# Patient Record
Sex: FEMALE | Race: WHITE | NOT HISPANIC OR LATINO | Employment: FULL TIME | URBAN - METROPOLITAN AREA
[De-identification: names, ages, dates, MRNs, and addresses within clinical notes are randomized per-mention and may not be internally consistent; named-entity substitution may affect disease eponyms.]

---

## 2020-05-21 ENCOUNTER — HOSPITAL ENCOUNTER (EMERGENCY)
Facility: HOSPITAL | Age: 35
Discharge: HOME | End: 2020-05-21

## 2020-05-21 VITALS
HEIGHT: 70 IN | HEART RATE: 113 BPM | DIASTOLIC BLOOD PRESSURE: 72 MMHG | SYSTOLIC BLOOD PRESSURE: 127 MMHG | OXYGEN SATURATION: 97 % | TEMPERATURE: 98 F | WEIGHT: 179 LBS | BODY MASS INDEX: 25.62 KG/M2 | RESPIRATION RATE: 18 BRPM

## 2020-05-21 DIAGNOSIS — S86.002A INJURY OF LEFT ACHILLES TENDON, INITIAL ENCOUNTER: ICD-10-CM

## 2020-05-21 DIAGNOSIS — M79.89 LEFT LEG SWELLING: Primary | ICD-10-CM

## 2020-05-21 PROCEDURE — 99284 EMERGENCY DEPT VISIT MOD MDM: CPT | Mod: 25 | Performed by: NURSE PRACTITIONER

## 2020-05-21 PROCEDURE — 93971 EXTREMITY STUDY: CPT | Mod: TC,LT

## 2020-05-21 PROCEDURE — 93971 US LOWER EXTREMITY VEINS LEFT: ICD-10-PCS | Mod: 26,LT,, | Performed by: RADIOLOGY

## 2020-05-21 PROCEDURE — 93971 EXTREMITY STUDY: CPT | Mod: 26,LT,, | Performed by: RADIOLOGY

## 2020-05-21 PROCEDURE — 29515 APPLICATION SHORT LEG SPLINT: CPT | Mod: LT | Performed by: NURSE PRACTITIONER

## 2020-05-21 NOTE — ED PROVIDER NOTES
"Encounter Date: 5/21/2020       History     Chief Complaint   Patient presents with    left calf pain/swelling/bruising     Benjamín Borden is a 35 year old female with no sign past medical history. She presents to ED with pain, swelling and discoloration to lower extremity    Patient reports that she forcefully stood up from a sitting position and felt pain to calf that she describes as a "joss horse". She awoke that next day with pain, swelling and discoloration from mid-calf to ankle.. She denies any other injury or trauma.    She was seen at the  today. XR tib-fib negative. She was referred to ED for US to rule out DVT    Patient with sign swelling to calf and area overlying achilles tendon. She is unable to planterflex with calf squeeze. She has light purple discoloration from mid-calf to posterior aspect of ankle    No pain or tenderness to medial or lateral aspect of ankle    Lower extremity is neurologically intact    No fever, chills, chest pain, dyspnea, weakness, syncope, dizziness or vomiting    No hx of DVT. She is not a smoker        Review of patient's allergies indicates:  No Known Allergies  History reviewed. No pertinent past medical history.  Past Surgical History:   Procedure Laterality Date    TONSILLECTOMY  1996     Family History   Problem Relation Age of Onset    Hypothyroidism Mother     Diabetes Sister      Social History     Tobacco Use    Smoking status: Current Every Day Smoker     Packs/day: 1.00     Types: Cigarettes   Substance Use Topics    Alcohol use: Not on file    Drug use: Not on file     Review of Systems   Constitutional: Negative.    HENT: Negative.    Eyes: Negative.    Respiratory: Negative.    Cardiovascular: Negative.    Gastrointestinal: Negative.    Endocrine: Negative.    Genitourinary: Negative.    Musculoskeletal: Positive for arthralgias (right calf/achilles).   Skin: Negative.    Allergic/Immunologic: Negative.    Neurological: Negative.  "   Hematological: Negative.    Psychiatric/Behavioral: Negative.        Physical Exam     Initial Vitals [05/21/20 1457]   BP Pulse Resp Temp SpO2   127/72 (!) 113 18 98.4 °F (36.9 °C) 97 %      MAP       --         Physical Exam    Vitals reviewed.  Constitutional: She appears well-developed and well-nourished. She is not diaphoretic. No distress.   HENT:   Head: Normocephalic.   Right Ear: External ear normal.   Left Ear: External ear normal.   Nose: Nose normal.   Mouth/Throat: Oropharynx is clear and moist.   Neck: Normal range of motion. Neck supple.   Cardiovascular: Normal rate.   Pulmonary/Chest: Breath sounds normal.   Musculoskeletal: She exhibits tenderness.        Left lower leg: She exhibits tenderness and swelling. She exhibits no edema, no deformity and no laceration.        Legs:       Left foot: There is decreased range of motion and swelling. There is no tenderness, no bony tenderness, normal capillary refill, no crepitus, no deformity and no laceration.   Neurological: She is alert and oriented to person, place, and time. GCS score is 15. GCS eye subscore is 4. GCS verbal subscore is 5. GCS motor subscore is 6.   Skin: Skin is warm. Capillary refill takes less than 2 seconds.   Psychiatric: She has a normal mood and affect. Her behavior is normal. Judgment and thought content normal.         ED Course   Splint Application  Date/Time: 5/21/2020 8:03 PM  Performed by: Hailey Lala NP  Authorized by: Hailey Lala NP   Splint type: short leg  Supplies used: elastic bandage,  Ortho-Glass and cotton padding  Post-procedure: The splinted body part was neurovascularly unchanged following the procedure.  Patient tolerance: Patient tolerated the procedure well with no immediate complications        Labs Reviewed - No data to display       Imaging Results    None          Medical Decision Making:   Initial Assessment:   Patient with pain, swelling and discoloration to lower extremity    Patient reports  "that she forcefully stood up from a sitting position and felt pain to calf that she describes as a "joss horse". She awoke that next day with pain, swelling and discoloration from mid-calf to ankle.. She denies any other injury or trauma.    She was seen at the  today. XR tib-fib negative. She was referred to ED for US to rule out DVT    Patient with sign swelling to calf and area overlying achilles tendon. She is unable to planterflex with calf squeeze. She has light purple discoloration from mid-calf to posterior aspect of ankle    No pain or tenderness to medial or lateral aspect of ankle    Lower extremity is neurologically intact    No fever, chills, chest pain, dyspnea, weakness, syncope, dizziness or vomiting    No hx of DVT. She is not a smoker    Differential Diagnosis:   Bone injury, DVT, achilles tendon injury, muscle strain   ED Management:  US negative    Post splint and crutches until follow-up with Ortho    Patient wants to take Motrin for pain    Discussed physical exam findings with patient  No acute emergent medical condition identified at this time to warrant further testing/diagnostics  At this time, I believe the patient is clinically stable for discharge.   Patient to follow up with Ortho in 1-2 days.  The patient acknowledges that close follow up with a MD is required after all ER visits  Pt given instructions; take all medications prescribed in the ER as directed.   Patient agrees to comply with all instruction and direction given in the ER  Pt agrees to return to ER if any symptoms reoccur                                          Clinical Impression:       ICD-10-CM ICD-9-CM   1. Left leg swelling M79.89 729.81   2. Injury of left Achilles tendon, initial encounter S86.002A 959.7                                Hailey Lala NP  05/21/20 2004    "

## 2020-05-21 NOTE — DISCHARGE INSTRUCTIONS
Post splint and crutches. Non-wt bearing until follow-up with PCP    Someone from the hospital will contact you in 1-2 business days to make appointment with Ortho    Motrin and ice    Return to ED if symptoms worsen

## 2020-05-22 ENCOUNTER — TELEPHONE (OUTPATIENT)
Dept: ORTHOPEDICS | Facility: CLINIC | Age: 35
End: 2020-05-22

## 2020-05-22 NOTE — TELEPHONE ENCOUNTER
Called patient to schedule referred appointment from [facility] with Dr. Bueno for treatment of an injury of left Achilles tendon. No answer at phone number [phone] and voicemail box is full. Unable to contact patient.

## 2020-05-25 ENCOUNTER — TELEPHONE (OUTPATIENT)
Dept: ORTHOPEDICS | Facility: CLINIC | Age: 35
End: 2020-05-25

## 2020-05-25 NOTE — TELEPHONE ENCOUNTER
Called patient to schedule referred appointment from [facility] with Dr. Bueno for treatment of an injury of left Achilles tendon. No answer at phone number [phone] and voicemail box is full.     Unable to contact patient X 2 attempts.

## 2020-05-26 ENCOUNTER — TELEPHONE (OUTPATIENT)
Dept: ORTHOPEDICS | Facility: CLINIC | Age: 35
End: 2020-05-26

## 2020-05-26 NOTE — TELEPHONE ENCOUNTER
Called patient to schedule referred appointment from [facility] with Dr. Bueno for treatment of an injury of left Achilles tendon. No answer at phone number [phone] and voicemail box is full.      Unable to contact patient X 3 attempts.

## 2022-05-11 ENCOUNTER — HOSPITAL ENCOUNTER (EMERGENCY)
Facility: HOSPITAL | Age: 37
Discharge: HOME | End: 2022-05-11
Attending: FAMILY MEDICINE
Payer: COMMERCIAL

## 2022-05-11 VITALS
BODY MASS INDEX: 23.34 KG/M2 | OXYGEN SATURATION: 98 % | HEIGHT: 70 IN | SYSTOLIC BLOOD PRESSURE: 110 MMHG | RESPIRATION RATE: 18 BRPM | DIASTOLIC BLOOD PRESSURE: 70 MMHG | HEART RATE: 90 BPM | WEIGHT: 163 LBS

## 2022-05-11 DIAGNOSIS — S16.1XXA ACUTE STRAIN OF NECK MUSCLE, INITIAL ENCOUNTER: ICD-10-CM
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
## 2022-05-11 DIAGNOSIS — R52 PAIN: ICD-10-CM

## 2022-05-11 LAB — B-HCG UR QL: NEGATIVE

## 2022-05-11 PROCEDURE — 72125 CT CERVICAL SPINE WITHOUT CONTRAST: ICD-10-PCS | Mod: 26,,, | Performed by: RADIOLOGY

## 2022-05-11 PROCEDURE — 72100 X-RAY EXAM L-S SPINE 2/3 VWS: CPT | Mod: 26,,, | Performed by: RADIOLOGY

## 2022-05-11 PROCEDURE — 72070 X-RAY EXAM THORAC SPINE 2VWS: CPT | Mod: 26,,, | Performed by: RADIOLOGY

## 2022-05-11 PROCEDURE — 99284 EMERGENCY DEPT VISIT MOD MDM: CPT | Mod: 25

## 2022-05-11 PROCEDURE — 72100 XR LUMBAR SPINE AP AND LATERAL: ICD-10-PCS | Mod: 26,,, | Performed by: RADIOLOGY

## 2022-05-11 PROCEDURE — 72070 X-RAY EXAM THORAC SPINE 2VWS: CPT | Mod: TC,FY

## 2022-05-11 PROCEDURE — 81025 URINE PREGNANCY TEST: CPT | Performed by: FAMILY MEDICINE

## 2022-05-11 PROCEDURE — 72125 CT NECK SPINE W/O DYE: CPT | Mod: TC

## 2022-05-11 PROCEDURE — 72125 CT NECK SPINE W/O DYE: CPT | Mod: 26,,, | Performed by: RADIOLOGY

## 2022-05-11 PROCEDURE — 72100 X-RAY EXAM L-S SPINE 2/3 VWS: CPT | Mod: TC,FY

## 2022-05-11 PROCEDURE — 72070 XR THORACIC SPINE AP LATERAL: ICD-10-PCS | Mod: 26,,, | Performed by: RADIOLOGY
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
## 2022-05-11 NOTE — DISCHARGE INSTRUCTIONS
You can use heating pad or cold pack to your hips use Tylenol or Motrin for pain if symptoms change or worsen see MD or return to the ER